# Patient Record
(demographics unavailable — no encounter records)

---

## 2024-12-12 NOTE — HISTORY OF PRESENT ILLNESS
[FreeTextEntry1] : Abdulkadir Perez is a 53 year old male with medical history significant for HLD presenting today referred by [facility] [place].  PM&R HPI 11/27/24: He reports he was seen in [place] ED on 11/21/24 for numbness and tingling in his BL arms and hands for 1-2 weeks. He also reports chronic left shoulder pain for 10 years that he reports is feeling improved. He reports having tingling in his BL lower legs as well. He also reports right sided low back pain for 5- 6 years. He reports chronic history of right sided lateral thigh numbness. He reports his pain is worse while lying flat on his back or with extension. He reports [place] area [place] and was diagnosed with alpha gal and ehrlichiosis by PCP Dr. Manish Dominguez this past year. He reports being on doxycycline for 2 weeks with no improvement in symptoms. He reports he has not had repeat blood work performed. He reports the burning, numbness and tingling felt into left leg has been for 2 years. He reports the right calf and foot has been having numbness and tingling starting this year. He brought MRI results with him into office performed 11/09/24 MRI L/S with evidence of multilevel lumbar spondylosis, multi level disc bulge, facet hypertrophy with effusions L3-L5, and transitional anatomy L5-S1 and XR SIJ with no acute findings. He reports initially taking meloxicam for a week with moderate relief in pain. He reports he plays golf weekly and has been having right sided back pain worsened with bending forward, reaching and sitting. He reports chronic history of tinnitus. He denies any visual changes, numbness or tingling, balance issues, decreased dexterity or increasing weakness, urinary or bowel changes or saddle anesthesia. He reports a pain level of 3/10 in office. Refer to neurology to further evaluate recent history of alpha gal and ehrlichiosis without improvement in symptoms with doxycycline for 2 weeks as per patient. Will consider EMG if radicular symptoms are not clinically correlated with MRI C/S. MRI [facility] - Mild to moderate multilevel cervical spondylosis, notable at C5-C6 with mild spinal canal and moderate to severe foraminal narrowing. No cord signal abnormality.  He endorses intermittent numbness and tingling in his bilateral legs for years, and then also affecting his arms over the past few weeks.  He said symptoms typically affect the distal parts of his extremities.  They occur mostly at night when he is laying down/horizontal, and it makes it hard for him to sleep.  He also thinks he has sleep apnea, as he wakes up with a dry mouth and also does not feel well rested after a full night of sleep.  During the day it is infrequent and there is no impairment to his functioning.  Neck and lower back pain, has been going to physical therapy for his low back twice a week for the past 2 weeks only, but it is improving the intensity of his BLE symptoms.  Also, he was diagnosed with ehrlichiosis over the past summer, was treated with doxycycline for 2 weeks, and was not sure if this correlated with his symptoms. 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

## 2024-12-12 NOTE — DATA REVIEWED
[de-identified] : MRI [facility] [place] 12/06/24 FINDINGS: DISC LEVEL EVALUATION: C1/C2: Normal. C2/C3: Tiny central protrusion which mildly contacts the cord. C3/C4: Mild disc bulge asymmetric to the right. Moderate right foraminal narrowing. C4/C5: Mild disc bulge. Mild right facet arthrosis. Moderate right foraminal narrowing. C5/C6: Mild retrolisthesis. Mild disc bulge. Mild spinal canal stenosis. Moderate to severe right and moderate left foraminal narrowing. C6/C7: Moderate left foraminal narrowing. C7/T1: Mild left foraminal narrowing. ALIGNMENT: Exaggerated cervical lordosis. Mild retrolisthesis of C5 on C6. CORD: No cord signal abnormality. MARROW: No fracture. Normal marrow signal. DISCS: Moderate multilevel loss of disc heights and disc signal. IMAGED BRAIN: Normal. PERIPHERAL/NECK SOFT TISSUES: Normal.  IMPRESSION: *  Mild to moderate multilevel cervical spondylosis, notable at C5-C6 with mild spinal canal and moderate to severe foraminal narrowing. *  No cord signal abnormality.  MRI [facility] 11/09/24 FINDINGS: Note that due to technical error the superior aspect of the L1 vertebral body is excluded from this examination. Mildly degraded by motion artifact. Transitional anatomy. L5-S1 is at image 45 of series 5. Very mild lumbar levoscoliosis with an apex near L4-L5. Very mild retrolisthesis of L4 on L5 and of L3 on L4. Lumbar alignment otherwise maintained. Visualized vertebral body heights are maintained. No acute fractures. No aggressive osseous lesions. Very mild multilevel endplate degenerative changes. Conus has a normal appearance and terminates at L1. Decreased T2 signal of the lumbar disks compatible with degenerative disc disease.  L1-L2: Disc bulge. Disc height loss. No significant spinal canal or neuroforaminal stenosis. No significant hypertrophic facet joint degeneration. L2-L3: Disc bulge. No significant disc height loss. No significant spinal canal or neuroforaminal stenosis. Mild hypertrophic facet joint degeneration. L3-L4: Disc bulge. No significant disc height loss. No significant spinal canal or neuroforaminal stenosis. Mild to moderate hypertrophic facet joint degeneration with small bilateral facet joint effusions.  L4-L5: Disc bulge. No significant disc height loss. No significant spinal canal or neuroforaminal stenosis. Mild to moderate hypertrophic facet joint degeneration with small bilateral facet joint effusions. Annular fissure. L5-S1: Disc bulge. Very mild disc height loss. No significant spinal canal or neuroforaminal stenosis. Mild hypertrophic facet joint degeneration. Annular fissure.  Paraspinal Soft Tissues/Retroperitoneum: Mild left hydronephrosis. ____________________ IMPRESSION: 1.  Note that due to technical error the superior aspect of the L1 vertebral body is excluded from this examination. 2.  Transitional anatomy with L5-S1 defined as above. 3.  Multilevel lumbar spondylosis as detailed above with no significant spinal canal or neuroforaminal stenosis.

## 2024-12-12 NOTE — ASSESSMENT
[FreeTextEntry1] : 53 year old male with medical history significant for HLD presenting today referred by [facility] [place]. MRI [facility] 12/06/24 - Mild to moderate multilevel cervical spondylosis, notable at C5-C6 with mild spinal canal and moderate to severe foraminal narrowing. No cord signal abnormality. MRI [facility] 11/09/24 - due to technical error the superior aspect of the L1 vertebral body is excluded from this examination. Transitional anatomy with L5-S1. Multilevel lumbar spondylosis as detailed above with no significant spinal canal or neuroforaminal stenosis. Physical exam without focal neuro deficit today.   Based on clinical history and presentation his symptoms likely radicular in nature, and BLE symptoms improving with PT.   Recommendations: - advised pt to continue PT for lower back and also incorporate neck/upper back - EMG/NCS of BUE and BLE to evaluate for conduction abnormalities - sleep disorder specialist referral, to evaluate poor sleep, r/o JOSÉ - infectious disease referral, to evaluate for tick borne diseases - consider bloodwork for neuropathy if no improvement or persistent sx - pt declined gabapentin for symptom relief - lifestyle modifications discussed - good posture at work, stretching and heating pad as tolerated, avoid prolonged positioning or provocative maneuvers  Patient to return to office in 2 months, or sooner if needed. Patient understands to seek urgent medical evaluation for any new or worsening symptoms.

## 2024-12-12 NOTE — PHYSICAL EXAM
[FreeTextEntry1] : NEURO: Mental Status Oriented to person, place, time, and situation Speech is clear, fluent, and spontaneous. Comprehension intact.   Cranial Nerves Visual fields full to confrontation Pupils equal, round, reactive to light. Extraocular movements intact. No nystagmus or ptosis. Facial sensation intact and symmetric in V1, V2, V3 Facial movement intact and symmetric Uvula midline, soft palate elevates normally Bilateral shoulder shrug intact Tongue midline, no tongue bite sign or deviation on protrusion   Motor Tone and bulk intact Shoulder abduction: 5/5 b/l Elbow flexion/extension: 5/5 bl Hand : 5/5 b/l Hip flexion/extension: 5/5 b/l Knee flexion/extension: 5/5 b/l No pronator drift   Sensation Light touch grossly intact Vibration intact b/l knees and ankles Neg tinel's, phalen's   Deep Tendon Reflexes Biceps 2+ b/l Brachioradialis 2+ b/l Patellar 2+ b/l    Coordination Normal finger to nose bilaterally No past pointing   Gait Normal stance, stride, and pivot turn Tandem walk intact Heel and toe gait intact. Negative Romberg.     GEN: awake, alert, interactive, no acute distress EYES: sclera white, conjunctiva clear, no redness or discharge ENT: normal appearing outer ears, hearing grossly intact NECK: ROM intact without pain. somewhat stooped posture.  PULM: normal respiratory rhythm and effort, speaking in full sentences without distress, no accessory muscle usage EXT: moving all extremities spontaneously SKIN: warm, dry

## 2024-12-12 NOTE — HISTORY OF PRESENT ILLNESS
[FreeTextEntry1] : Abdulkadir Perez is a 53 year old male with medical history significant for HLD presenting today referred by [facility] [place].  PM&R HPI 11/27/24: He reports he was seen in [place] ED on 11/21/24 for numbness and tingling in his BL arms and hands for 1-2 weeks. He also reports chronic left shoulder pain for 10 years that he reports is feeling improved. He reports having tingling in his BL lower legs as well. He also reports right sided low back pain for 5- 6 years. He reports chronic history of right sided lateral thigh numbness. He reports his pain is worse while lying flat on his back or with extension. He reports [place] area [place] and was diagnosed with alpha gal and ehrlichiosis by PCP Dr. Manish Dominguez this past year. He reports being on doxycycline for 2 weeks with no improvement in symptoms. He reports he has not had repeat blood work performed. He reports the burning, numbness and tingling felt into left leg has been for 2 years. He reports the right calf and foot has been having numbness and tingling starting this year. He brought MRI results with him into office performed 11/09/24 MRI L/S with evidence of multilevel lumbar spondylosis, multi level disc bulge, facet hypertrophy with effusions L3-L5, and transitional anatomy L5-S1 and XR SIJ with no acute findings. He reports initially taking meloxicam for a week with moderate relief in pain. He reports he plays golf weekly and has been having right sided back pain worsened with bending forward, reaching and sitting. He reports chronic history of tinnitus. He denies any visual changes, numbness or tingling, balance issues, decreased dexterity or increasing weakness, urinary or bowel changes or saddle anesthesia. He reports a pain level of 3/10 in office. Refer to neurology to further evaluate recent history of alpha gal and ehrlichiosis without improvement in symptoms with doxycycline for 2 weeks as per patient. Will consider EMG if radicular symptoms are not clinically correlated with MRI C/S. MRI [facility] - Mild to moderate multilevel cervical spondylosis, notable at C5-C6 with mild spinal canal and moderate to severe foraminal narrowing. No cord signal abnormality.  He endorses intermittent numbness and tingling in his bilateral legs for years, and then also affecting his arms over the past few weeks.  He said symptoms typically affect the distal parts of his extremities.  They occur mostly at night when he is laying down/horizontal, and it makes it hard for him to sleep.  He also thinks he has sleep apnea, as he wakes up with a dry mouth and also does not feel well rested after a full night of sleep.  During the day it is infrequent and there is no impairment to his functioning.  Neck and lower back pain, has been going to physical therapy for his low back twice a week for the past 2 weeks only, but it is improving the intensity of his BLE symptoms.  Also, he was diagnosed with ehrlichiosis over the past summer, was treated with doxycycline for 2 weeks, and was not sure if this correlated with his symptoms. 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

## 2024-12-12 NOTE — DATA REVIEWED
[de-identified] : MRI [facility] 12/06/24 FINDINGS: DISC LEVEL EVALUATION: C1/C2: Normal. C2/C3: Tiny central protrusion which mildly contacts the cord. C3/C4: Mild disc bulge asymmetric to the right. Moderate right foraminal narrowing. C4/C5: Mild disc bulge. Mild right facet arthrosis. Moderate right foraminal narrowing. C5/C6: Mild retrolisthesis. Mild disc bulge. Mild spinal canal stenosis. Moderate to severe right and moderate left foraminal narrowing. C6/C7: Moderate left foraminal narrowing. C7/T1: Mild left foraminal narrowing. ALIGNMENT: Exaggerated cervical lordosis. Mild retrolisthesis of C5 on C6. CORD: No cord signal abnormality. MARROW: No fracture. Normal marrow signal. DISCS: Moderate multilevel loss of disc heights and disc signal. IMAGED BRAIN: Normal. PERIPHERAL/NECK SOFT TISSUES: Normal.  IMPRESSION: *  Mild to moderate multilevel cervical spondylosis, notable at C5-C6 with mild spinal canal and moderate to severe foraminal narrowing. *  No cord signal abnormality.  MRI [facility] 11/09/24 FINDINGS: Note that due to technical error the superior aspect of the L1 vertebral body is excluded from this examination. Mildly degraded by motion artifact. Transitional anatomy. L5-S1 is at image 45 of series 5. Very mild lumbar levoscoliosis with an apex near L4-L5. Very mild retrolisthesis of L4 on L5 and of L3 on L4. Lumbar alignment otherwise maintained. Visualized vertebral body heights are maintained. No acute fractures. No aggressive osseous lesions. Very mild multilevel endplate degenerative changes. Conus has a normal appearance and terminates at L1. Decreased T2 signal of the lumbar disks compatible with degenerative disc disease.  L1-L2: Disc bulge. Disc height loss. No significant spinal canal or neuroforaminal stenosis. No significant hypertrophic facet joint degeneration. L2-L3: Disc bulge. No significant disc height loss. No significant spinal canal or neuroforaminal stenosis. Mild hypertrophic facet joint degeneration. L3-L4: Disc bulge. No significant disc height loss. No significant spinal canal or neuroforaminal stenosis. Mild to moderate hypertrophic facet joint degeneration with small bilateral facet joint effusions.  L4-L5: Disc bulge. No significant disc height loss. No significant spinal canal or neuroforaminal stenosis. Mild to moderate hypertrophic facet joint degeneration with small bilateral facet joint effusions. Annular fissure. L5-S1: Disc bulge. Very mild disc height loss. No significant spinal canal or neuroforaminal stenosis. Mild hypertrophic facet joint degeneration. Annular fissure.  Paraspinal Soft Tissues/Retroperitoneum: Mild left hydronephrosis. ____________________ IMPRESSION: 1.  Note that due to technical error the superior aspect of the L1 vertebral body is excluded from this examination. 2.  Transitional anatomy with L5-S1 defined as above. 3.  Multilevel lumbar spondylosis as detailed above with no significant spinal canal or neuroforaminal stenosis.

## 2025-01-07 NOTE — ASSESSMENT
[FreeTextEntry1] : 53 Male with history Erlichhia   Chafffensis AB treated with 14 day course Doxycycline Hx Resolved Alpha-GAl allergy. No evidence for severe Ehrlichia / Anaplasmosis Normal immune markers cervical -lumbar degenerative arthritis full myositis panel pending Had Influenza vaccine Doubt active infection  PLAN:  1.  LABS:  CBC, CMP, ESR, CRP,  FRANCINE, Ehrlichia/ Anaplasma PCR             2.  Phone F/U Thurs  1/9/25            3.  No additional AB  [Treatment Education] : treatment education [Medical Care Issues] : medical care issues

## 2025-01-07 NOTE — PHYSICAL EXAM
[General Appearance - Alert] : alert [General Appearance - In No Acute Distress] : in no acute distress [General Appearance - Well Nourished] : well nourished [General Appearance - Well-Appearing] : healthy appearing [Sclera] : the sclera and conjunctiva were normal [PERRL With Normal Accommodation] : pupils were equal in size, round, reactive to light [Extraocular Movements] : extraocular movements were intact [Outer Ear] : the ears and nose were normal in appearance [Hearing Threshold Finger Rub Not [place]] : hearing was normal [Examination Of The Oral Cavity] : the lips and gums were normal [Oropharynx] : the oropharynx was normal with no thrush [Neck Appearance] : the appearance of the neck was normal [Neck Cervical Mass (___cm)] : no neck mass was observed [Jugular Venous Distention Increased] : there was no jugular-venous distention [Thyroid Diffuse Enlargement] : the thyroid was not enlarged [Respiration, Rhythm And Depth] : normal respiratory rhythm and effort [Exaggerated Use Of Accessory Muscles For Inspiration] : no accessory muscle use [Auscultation Breath Sounds / Voice Sounds] : lungs were clear to auscultation bilaterally [Heart Rate And Rhythm] : heart rate was normal and rhythm regular [Heart Sounds] : normal S1 and S2 [Heart Sounds Gallop] : no gallops [Murmurs] : no murmurs [Heart Sounds Pericardial Friction Rub] : no pericardial rub [Full Pulse] : the pedal pulses are present [Edema] : there was no peripheral edema [Bowel Sounds] : normal bowel sounds [Abdomen Soft] : soft [Abdomen Tenderness] : non-tender [Abdomen Mass (___ Cm)] : no abdominal mass palpated [Costovertebral Angle Tenderness] : no CVA tenderness [No Palpable Adenopathy] : no palpable adenopathy [Cervical Lymph Nodes Enlarged Posterior Bilaterally] : posterior cervical [Cervical Lymph Nodes Enlarged Anterior Bilaterally] : anterior cervical [Musculoskeletal - Swelling] : no joint swelling [Range of Motion to Joints] : range of motion to joints [Nail Clubbing] : no clubbing  or cyanosis of the fingernails [Motor Tone] : muscle strength and tone were normal [Skin Color & Pigmentation] : normal skin color and pigmentation [] : no rash [Skin Lesions] : no skin lesions [Cranial Nerves] : cranial nerves 2-12 were intact [Sensation] : the sensory exam was normal to light touch and pinprick [Motor Exam] : the motor exam was normal [No Focal Deficits] : no focal deficits [Oriented To Time, Place, And Person] : oriented to person, place, and time [Affect] : the affect was normal

## 2025-01-07 NOTE — REASON FOR VISIT
[Consultation] : a consultation visit [FreeTextEntry1] : New pt referred by Neurologist for hx of ehrlichiosis. pt was tx with 10 days of doxy in 2024, not currently on abx.

## 2025-01-07 NOTE — REVIEW OF SYSTEMS
[Fever] : no fever [Chills] : no chills [Body Aches] : no body aches [Difficulty Sleeping] : no difficulty sleeping [Feeling Sick] : not feeling sick [Feeling Tired] : not feeling tired [Normal Appetite] : appetite not normal  [Eye Pain] : no eye pain [Red Eyes] : eyes not red [Eyesight Problems] : no eyesight problems [Discharge From Eyes] : no purulent discharge from the eyes [Earache] : no earache [Loss Of Hearing] : no hearing loss [Nasal Discharge] : no nasal discharge [Sore Throat] : no sore throat [Hoarseness] : no hoarseness [Chest Pain] : no chest pain [Palpitations] : no palpitations [Leg Claudication] : no intermittent leg claudication [Lower Ext Edema] : no extremity edema [Shortness Of Breath] : no shortness of breath [Wheezing] : no wheezing [Cough] : no cough [SOB on Exertion] : no shortness of breath during exertion [Sputum] : not coughing up ~M sputum [Pleuritic Chest Pain] : no pleuritic chest pain [Abdominal Pain] : no abdominal pain [Vomiting] : no vomiting [Constipation] : no constipation [Diarrhea] : no diarrhea [Dysuria] : no dysuria [Joint Pain] : no joint pain [Joint Swelling] : no joint swelling [Joint Stiffness] : no joint stiffness [Limb Pain] : no limb pain [Limb Swelling] : no limb swelling [Skin Lesions] : no skin lesions [Skin Wound] : no skin wound [Itching] : no itching [Negative] : Heme/Lymph

## 2025-01-07 NOTE — HISTORY OF PRESENT ILLNESS
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  Had Positive Ehrlichia AB titer treated with 14 day  course of Doxycycline 6 months ago.  Patient has upper extremity distal numbness and tingling of lower extremities and was concerned about chronic Ehrlichia.  Pt was diagnosed  with Alpha-Gal a few years ago but all red meat allergy sx have resolved  . LABS  12/27/24:  WBC 6.1,  Hgb  15,  PLT  175,  Hgb A1C  5.3%, Creat 1.2, CPK 85,  CRP < 3Ehrllichia Chaffeenis IgG +  1: 256,   Anaplasma Negative

## 2025-03-14 NOTE — ASSESSMENT
[FreeTextEntry1] : 53 year old male with medical history significant for HLD presenting today for f/u. MRI [facility] 12/06/24 - Mild to moderate multilevel cervical spondylosis, notable at C5-C6 with mild spinal canal and moderate to severe foraminal narrowing. No cord signal abnormality. MRI [facility] 11/09/24 - due to technical error the superior aspect of the L1 vertebral body is excluded from this examination. Transitional anatomy with L5-S1. Multilevel lumbar spondylosis as detailed above with no significant spinal canal or neuroforaminal stenosis. Physical exam without focal neuro deficit today.  Good relief with GBP.  Per pt he had nerve studies done and was told only finding was LUE ulnar neuropathy. He spends a lot of time leaning on elbows at desk [employer] job. Also his mattress is old and very firm which may contribute to BLE sx that only occur when laying down at night.   Recommendations: - continue  mg 1/2 to 1 cap at night (per pt he opens capsules and splits powder inside) - he may call if needs any refills - continue f/u with back and neck pain center NP - pain management referral - Dr. Rios for lumbar steroid injections  - advised pt to continue PT exercises for lower back and also incorporate neck/upper back - will obtain records EMG/NCS of BUE and BLE - [facility] - lifestyle modifications discussed - good posture at work, stretching and heating pad as tolerated, avoid prolonged positioning or provocative maneuvers - behavioral modifications for ulnar neuropathy - avoid leaning on elbows or prolonged elbow flexion. try wrapping soft towel around elbow at night to avoid flexion during sleep. He states he has tennis elbow brace so can try this at night.  Patient to return to office as needed for GBP refills - states he also gets this from NP. Patient understands to seek urgent medical evaluation for any new or worsening symptoms.

## 2025-03-14 NOTE — HISTORY OF PRESENT ILLNESS
[FreeTextEntry1] : Abdulkadir Perez is a 53 year old male with medical history significant for HLD presenting today referred by [facility] [place].  He remains with numbness and tingling in bilateral lower extremities, typically only happens at night when he is laying flat.  In the daytime when he is up or sitting he is symptom free.  He had nerve studies done at [facility], they had a nurse practitioner come into the room and discussed the results with him, and he was told that he has L UE ulnar neuropathy, and RUE and BLE were within normal limits.  He finds the gabapentin helpful for symptom relief, he opens the 300 mg capsule and takes half of the powder inside which is helpful.  He does physical therapy exercises at home which is helpful.  He sees practitioner at the [facility], and they suggested that he do steroid injections for his lower back, see if this helps.  His mattress is over 10 years old and is very firm.  No other new symptoms. _______  chart note 1/09/25: Spoke to pt, ok to stop GBP if he desires, only taking 1 cap nightly, it helps him sleep. I emphasized previous reccs of sleep specialist evaluation and also EMG/NCS. He voices understanding.   chart note 12/24/24: Spoke to pt, he said he would like script for nighttime dose of GBP since symptoms are keeping him awake. Education and counseling provided. Will take 1 cap nightly PRN for sx, if pt should need daytime dosing I advised to increase slowly to 1 cap TID by 1 cap per week. If any issues he will call back.   initial HPI 12/11/24: He endorses intermittent numbness and tingling in his bilateral legs for years, and then also affecting his arms over the past few weeks. He said symptoms typically affect the distal parts of his extremities. They occur mostly at night when he is laying down/horizontal, and it makes it hard for him to sleep. He also thinks he has sleep apnea, as he wakes up with a dry mouth and also does not feel well rested after a full night of sleep. During the day it is infrequent and there is no impairment to his functioning. Neck and lower back pain, has been going to physical therapy for his low back twice a week for the past 2 weeks only, but it is improving the intensity of his BLE symptoms. Also, he was diagnosed with ehrlichiosis over the past summer, was treated with doxycycline for 2 weeks, and was not sure if this correlated with his symptoms. 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 MRI [facility]INE 12/06/24 - Mild to moderate multilevel cervical spondylosis, notable at C5-C6 with mild spinal canal and moderate to severe foraminal narrowing. No cord signal abnormality MRI LSPINE 11/09/24 - due to technical error the superior aspect of the L1 vertebral body is excluded from this examination. Transitional anatomy with L5-S1. Multilevel lumbar spondylosis as detailed above with no significant spinal canal or neuroforaminal stenosis.

## 2025-03-14 NOTE — HISTORY OF PRESENT ILLNESS
[FreeTextEntry1] : Abdulkadir Perez is a 53 year old male with medical history significant for HLD presenting today referred by [facility] [place].  He remains with numbness and tingling in bilateral lower extremities, typically only happens at night when he is laying flat.  In the daytime when he is up or sitting he is symptom free.  He had nerve studies done at [facility], they had a nurse practitioner come into the room and discussed the results with him, and he was told that he has L UE ulnar neuropathy, and RUE and BLE were within normal limits.  He finds the gabapentin helpful for symptom relief, he opens the 300 mg capsule and takes half of the powder inside which is helpful.  He does physical therapy exercises at home which is helpful.  He sees practitioner at the [facility], and they suggested that he do steroid injections for his lower back, see if this helps.  His mattress is over 10 years old and is very firm.  No other new symptoms. _______  chart note 1/09/25: Spoke to pt, ok to stop GBP if he desires, only taking 1 cap nightly, it helps him sleep. I emphasized previous reccs of sleep specialist evaluation and also EMG/NCS. He voices understanding.   chart note 12/24/24: Spoke to pt, he said he would like script for nighttime dose of GBP since symptoms are keeping him awake. Education and counseling provided. Will take 1 cap nightly PRN for sx, if pt should need daytime dosing I advised to increase slowly to 1 cap TID by 1 cap per week. If any issues he will call back.   initial HPI 12/11/24: He endorses intermittent numbness and tingling in his bilateral legs for years, and then also affecting his arms over the past few weeks. He said symptoms typically affect the distal parts of his extremities. They occur mostly at night when he is laying down/horizontal, and it makes it hard for him to sleep. He also thinks he has sleep apnea, as he wakes up with a dry mouth and also does not feel well rested after a full night of sleep. During the day it is infrequent and there is no impairment to his functioning. Neck and lower back pain, has been going to physical therapy for his low back twice a week for the past 2 weeks only, but it is improving the intensity of his BLE symptoms. Also, he was diagnosed with ehrlichiosis over the past summer, was treated with doxycycline for 2 weeks, and was not sure if this correlated with his symptoms. 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 MRI CSPINE 12/06/24 - Mild to moderate multilevel cervical spondylosis, notable at C5-C6 with mild spinal canal and moderate to severe foraminal narrowing. No cord signal abnormality MRI LSPINE 11/09/24 - due to technical error the superior aspect of the L1 vertebral body is excluded from this examination. Transitional anatomy with L5-S1. Multilevel lumbar spondylosis as detailed above with no significant spinal canal or neuroforaminal stenosis.

## 2025-03-14 NOTE — PHYSICAL EXAM
[FreeTextEntry1] : NEURO: Mental Status Oriented to person, place, time, and situation Speech is clear, fluent, and spontaneous. Comprehension intact.  Cranial Nerves Visual fields full to confrontation Pupils equal, round, reactive to light. Extraocular movements intact. No nystagmus or ptosis. Facial sensation intact and symmetric in V1, V2, V3 Facial movement intact and symmetric Uvula midline, soft palate elevates normally Bilateral shoulder shrug intact Tongue midline, no tongue bite sign or deviation on protrusion  Motor Tone and bulk intact Shoulder abduction: 5/5 b/l Elbow flexion/extension: 5/5 bl Hand : 5/5 b/l Hip flexion/extension: 5/5 b/l Knee flexion/extension: 5/5 b/l No pronator drift  Sensation Light touch grossly intact Neg tinel's, phalen's  Deep Tendon Reflexes Biceps 2+ b/l Brachioradialis 2+ b/l Patellar 2+ b/l   Coordination Normal finger to nose bilaterally No past pointing  Gait Normal stance, stride, and pivot turn  GEN: awake, alert, interactive, no acute distress EYES: sclera white, conjunctiva clear, no redness or discharge ENT: normal appearing outer ears, hearing grossly intact NECK: ROM intact without pain. somewhat stooped posture. PULM: normal respiratory rhythm and effort, speaking in full sentences without distress, no accessory muscle usage EXT: moving all extremities spontaneously SKIN: warm, dry.

## 2025-03-14 NOTE — ASSESSMENT
[FreeTextEntry1] : 53 year old male with medical history significant for HLD presenting today for f/u. MRI CSPINE 12/06/24 - Mild to moderate multilevel cervical spondylosis, notable at C5-C6 with mild spinal canal and moderate to severe foraminal narrowing. No cord signal abnormality. MRI LSPINE 11/09/24 - due to technical error the superior aspect of the L1 vertebral body is excluded from this examination. Transitional anatomy with L5-S1. Multilevel lumbar spondylosis as detailed above with no significant spinal canal or neuroforaminal stenosis. Physical exam without focal neuro deficit today.  Good relief with GBP.  Per pt he had nerve studies done and was told only finding was LUE ulnar neuropathy. He spends a lot of time leaning on elbows at desk [employer] job. Also his mattress is old and very firm which may contribute to BLE sx that only occur when laying down at night.   Recommendations: - continue  mg 1/2 to 1 cap at night (per pt he opens capsules and splits powder inside) - he may call if needs any refills - continue f/u with [facility] - pain management referral - Dr. Rios for lumbar steroid injections  - advised pt to continue PT exercises for lower back and also incorporate neck/upper back - will obtain records EMG/NCS of BUE and BLE - [facility] - lifestyle modifications discussed - good posture at work, stretching and heating pad as tolerated, avoid prolonged positioning or provocative maneuvers - behavioral modifications for ulnar neuropathy - avoid leaning on elbows or prolonged elbow flexion. try wrapping soft towel around elbow at night to avoid flexion during sleep. He states he has tennis elbow brace so can try this at night.  Patient to return to office as needed for GBP refills - states he also gets this from NP. Patient understands to seek urgent medical evaluation for any new or worsening symptoms.